# Patient Record
Sex: MALE | Race: ASIAN | NOT HISPANIC OR LATINO | Employment: FULL TIME | URBAN - METROPOLITAN AREA
[De-identification: names, ages, dates, MRNs, and addresses within clinical notes are randomized per-mention and may not be internally consistent; named-entity substitution may affect disease eponyms.]

---

## 2023-01-03 ENCOUNTER — APPOINTMENT (OUTPATIENT)
Dept: CT IMAGING | Facility: HOSPITAL | Age: 58
End: 2023-01-03
Attending: EMERGENCY MEDICINE
Payer: COMMERCIAL

## 2023-01-03 ENCOUNTER — HOSPITAL ENCOUNTER (EMERGENCY)
Facility: HOSPITAL | Age: 58
Discharge: HOME | End: 2023-01-03
Attending: EMERGENCY MEDICINE | Admitting: EMERGENCY MEDICINE
Payer: COMMERCIAL

## 2023-01-03 VITALS
SYSTOLIC BLOOD PRESSURE: 121 MMHG | BODY MASS INDEX: 23.36 KG/M2 | DIASTOLIC BLOOD PRESSURE: 71 MMHG | TEMPERATURE: 97.9 F | HEIGHT: 68 IN | OXYGEN SATURATION: 96 % | RESPIRATION RATE: 19 BRPM | WEIGHT: 154.1 LBS | HEART RATE: 80 BPM
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
## 2023-01-03 DIAGNOSIS — S29.012A MUSCLE STRAIN OF LEFT UPPER BACK, INITIAL ENCOUNTER: ICD-10-CM

## 2023-01-03 PROCEDURE — 70450 CT HEAD/BRAIN W/O DYE: CPT

## 2023-01-03 PROCEDURE — 99284 EMERGENCY DEPT VISIT MOD MDM: CPT | Mod: 25

## 2023-01-03 ASSESSMENT — ENCOUNTER SYMPTOMS
VOMITING: 0
NAUSEA: 0
HEADACHES: 0
ABDOMINAL PAIN: 0
DIZZINESS: 0
CONFUSION: 0
ARTHRALGIAS: 1

## 2023-01-03 ASSESSMENT — ACTIVITIES OF DAILY LIVING (ADL): ADLS_ACUITY_SCORE: 33

## 2023-01-03 NOTE — DISCHARGE INSTRUCTIONS
Your head CT scan appears reassuring.  At this time you do not appear to have symptoms of a concussion.    The need to apply ice to the affected areas of discomfort.  Continue using over-the-counter ibuprofen as needed for any further pain.      Follow-up with your primary care provider for reevaluation as needed or return back to ED sooner for any new or concerning symptoms.

## 2023-01-03 NOTE — ED TRIAGE NOTES
Pt slipped and fall last night and fell backwards due to slipping from ice.  He hit his head and thinks he had LOC for 3 seconds and was able to get up after the fall.  Not on blood thinners.  Pt having left shoulder pain and left hip pain but ambulatory.  Would like to have xrays done.  Ibuprofen taken last night with some help     Triage Assessment     Row Name 01/03/23 1032       Triage Assessment (Adult)    Airway WDL WDL       Respiratory WDL    Respiratory WDL WDL       Skin Circulation/Temperature WDL    Skin Circulation/Temperature WDL WDL       Peripheral/Neurovascular WDL    Peripheral Neurovascular WDL WDL       Cognitive/Neuro/Behavioral WDL    Cognitive/Neuro/Behavioral WDL WDL

## 2023-01-03 NOTE — ED NOTES
Patient states fall yesterday, short LOC after fall. Complaining of left hip pain. Concerned about head injury. Up in room independently

## 2023-01-03 NOTE — ED PROVIDER NOTES
EMERGENCY DEPARTMENT ENCOUNTER      NAME: Wilmer Shook  AGE: 57 year old male  YOB: 1965  MRN: [id number]  EVALUATION DATE & TIME: 1/3/2023 10:56 AM    PCP: No primary care provider on file.    ED PROVIDER: Wesly Calvillo DO      Chief Complaint   Patient presents with     Fall         FINAL IMPRESSION:  1. Fall due to slipping on ice or snow, initial encounter    2. Muscle strain of left upper back, initial encounter          ED COURSE & MEDICAL DECISION MAKIN-year-old male presented to the ED for evaluation following a fall yesterday.  The patient slipped on the ice and hit his head.  He dates that he may have lost consciousness for a few seconds.  The patient also injured his left shoulder and hip but he is able to use both without any difficulty.  Here in the ED the patient denies any headaches, dizziness, vision changes, nausea or vomiting, confusion, difficulty concentrating, or sleep disturbances.  The patient states that he presented to the ED because his wife instructed him to get a head CT scan.  The patient was hemodynamically stable upon arrival.  He did not appear to be in any obvious distress or discomfort at the time of his initial evaluation.  Patient's physical exam was unremarkable.  There were no active trauma or injury noted on exam.    Following his initial evaluation the patient was informed that he does not appear to have symptoms of a concussion at this time.  He was also informed that a head CT scan was not indicated.  However, the patient still requested a head CT had to be performed for peace of mind.    The patient was then sent down to radiology.  Head CT scan was nondiagnostic.    The patient was reevaluated and informed of the reassuring head CT scan results.  The patient was informed that he likely has a strain of his upper back and neck musculature.  He was instructed to continue using ibuprofen as needed for pain and to apply ice to the affected areas every few  hours for the next 3 to 4 days.  The patient was instructed to follow-up with his primary care provider for reevaluation as needed or to return back to ED sooner for any new or concerning symptoms.    Pertinent Labs & Imaging studies reviewed. (See chart for details)  11:00 AM I met with the patient to gather history and to perform my initial exam. We discussed plans for the ED course, including diagnostic testing and treatment.       At the conclusion of the encounter I discussed the results of all of the tests and the disposition. The questions were answered. The patient or family acknowledged understanding and was agreeable with the care plan.       PPE worn: surgical mask, nitrile gloves    Medical Decision Making    History:    Supplemental history from: N/A    External Record(s) reviewed: Other: N/A    Work Up:    Chart documentation includes differential considered and any EKGs or imaging independently interpreted by provider.    In additional to work up documented, I considered the following work up: See chart documentation, if applicable.    External consultation:    Discussion of management with another provider: See chart documentation, if applicable    Complicating factors:    Care impacted by chronic illness: N/A    Care affected by social determinants of health: N/A    Disposition considerations: Discharge. No recommendations on prescription strength medication(s). N/A.    MEDICATIONS GIVEN IN THE EMERGENCY:  Medications - No data to display    NEW PRESCRIPTIONS STARTED AT TODAY'S ER VISIT  New Prescriptions    No medications on file        =================================================================    HPI    Patient information was obtained from: Patient    Use of : N/A    Wilmer Shook is a 57 year old male with no known pertinent history who presents to this ED by walk-in for evaluation of mechanical fall.    Patient reports that he slipped on ice last night and fell back onto his  "head. He notes that he did lose consciousness for about 2-3 seconds, and reports extreme pain at the time but denies any head pain while in the ED. Patient also complains of left shoulder pain and bilateral hip pain. He otherwise denies any headache, nausea, vomiting, dizziness, confusion, chest pain, or abdominal pain. Patient expresses concern for his head at this time and requests a head CT. He took ibuprofen prior to arrival with improvement.    Of note, patient does not take any daily medications including any blood thinners.     REVIEW OF SYSTEMS   Review of Systems   Cardiovascular: Negative for chest pain.   Gastrointestinal: Negative for abdominal pain, nausea and vomiting.   Musculoskeletal: Positive for arthralgias (bilateral hip pain, left shoulder).   Neurological: Positive for syncope (2-3 seconds secondary to fall). Negative for dizziness and headaches.   Psychiatric/Behavioral: Negative for confusion.   All other systems reviewed and are negative.       PAST MEDICAL HISTORY:  History reviewed. No pertinent past medical history.    PAST SURGICAL HISTORY:  History reviewed. No pertinent surgical history.    CURRENT MEDICATIONS:    No current outpatient medications on file.      ALLERGIES:  Allergies   Allergen Reactions     Aspirin      Abdominal pain       FAMILY HISTORY:  No family history on file.    SOCIAL HISTORY:   Social History     Socioeconomic History     Marital status:      Spouse name: None     Number of children: None     Years of education: None     Highest education level: None       VITALS:  /71   Pulse 80   Temp 97.9  F (36.6  C) (Oral)   Resp 19   Ht 1.727 m (5' 8\")   Wt 69.9 kg (154 lb 1.6 oz)   SpO2 96%   BMI 23.43 kg/m      PHYSICAL EXAM    General Presentation: No apparent distress.  ENT: Ears atraumatic. Ear canals clear. No blood or CSF in canals. Nose atraumatic/non-tender. No septal hematoma. Face/mandible non-tender. Oropharynx clear.  Eye: Pupils equal " round and reactive to light. EOMFI. No conjunctival hemorrhage. No hyphema. Eye lids normal.  Pulmonary: Spontaneous respiration. No respiratory distress. Clear equal breath sounds. Airway patent. Speech is normal. No stridor. Grossly stable chest wall. No crepitus. No chest wall tenderness to palpation noted.  Circulatory: Regular rate and rhythm. No murmurs, rubs, or gallops. Normal capillary refill.   Abdominal: Abdomen soft, non-tender and non-distended. No peritoneal signs. No flank tenderness. Normal bowel sounds. Pelvis stable/non-tender.   Neurologic: Alert and awake. Cranial nerves II-XII grossly intact.  No gross motor deficit. No gross sensory deficit. Normal upper extremity and lower extremity strength. Gisele Coma Score 15.  Spine: No bony tenderness to palpation in the cervical spine, thoracic spine, lumbar spine, or sacrum.     Upper extremities:  Full range of motion. No tenderness to palpation.  Pulses 2/4 bilateral upper extremities . No wounds, abrasions, lacerations, or contusions noted.   Lower extremities:  Full range of motion. No tenderness to palpation.  Pulses 2/4 bilateral lower extremities . No wounds, abrasions, lacerations, or contusions noted.   Skin: Head/scalp non tender. No wounds, abrasions, lacerations, or contusions of head/scalp, chest, back, abdomen, flank or pelvis.        LAB:  All pertinent labs reviewed and interpreted.  Results for orders placed or performed during the hospital encounter of 01/03/23   Head CT w/o contrast    Impression    IMPRESSION:  1.  Normal head CT.       RADIOLOGY:  Reviewed all pertinent imaging. Please see official radiology report.  Head CT w/o contrast   Final Result   IMPRESSION:   1.  Normal head CT.          IMani, am serving as a scribe to document services personally performed by Wesly Calvillo, DO based on my observation and the provider's statements to me. I, Wesly Calvillo, attest that Mani Kohler is acting in a scribe capacity, has  observed my performance of the services and has documented them in accordance with my direction.    Wesly Calvillo DO  Emergency Medicine  [facility] EMERGENCY DEPARTMENT  [address] [place] [id number] [phone]     Wesly Calvillo DO  01/03/23 1146

## 2023-01-03 NOTE — ED TRIAGE NOTES
Pt denies dizziness, headache or vision changes.  Pain is 1/10 at the moment     Triage Assessment     Row Name 01/03/23 1032       Triage Assessment (Adult)    Airway WDL WDL       Respiratory WDL    Respiratory WDL WDL       Skin Circulation/Temperature WDL    Skin Circulation/Temperature WDL WDL       Peripheral/Neurovascular WDL    Peripheral Neurovascular WDL WDL       Cognitive/Neuro/Behavioral WDL    Cognitive/Neuro/Behavioral WDL WDL